# Patient Record
Sex: MALE | Race: WHITE
[De-identification: names, ages, dates, MRNs, and addresses within clinical notes are randomized per-mention and may not be internally consistent; named-entity substitution may affect disease eponyms.]

---

## 2023-01-03 PROBLEM — Z00.00 ENCOUNTER FOR PREVENTIVE HEALTH EXAMINATION: Status: ACTIVE | Noted: 2023-01-03

## 2023-02-07 ENCOUNTER — APPOINTMENT (OUTPATIENT)
Dept: UROLOGY | Facility: CLINIC | Age: 28
End: 2023-02-07
Payer: MEDICAID

## 2023-02-07 VITALS — SYSTOLIC BLOOD PRESSURE: 160 MMHG | HEART RATE: 114 BPM | DIASTOLIC BLOOD PRESSURE: 90 MMHG | TEMPERATURE: 98.7 F

## 2023-02-07 DIAGNOSIS — Q53.10 UNSPECIFIED UNDESCENDED TESTICLE, UNILATERAL: ICD-10-CM

## 2023-02-07 PROCEDURE — 99204 OFFICE O/P NEW MOD 45 MIN: CPT | Mod: 25

## 2023-02-07 PROCEDURE — 93975 VASCULAR STUDY: CPT

## 2023-02-07 NOTE — HISTORY OF PRESENT ILLNESS
[FreeTextEntry1] : 27M unmarried\par has intraabdominal testes\par never attempted to bring down\par here to discuss\par

## 2023-02-07 NOTE — ASSESSMENT
[FreeTextEntry1] : right cryptorchid testis\par moderate left varicocele\par role of cryptorchidism in infertility was reviewed\par given healthy robust left sided testicle it is unclear whether the undescended testis will effect his future fertility potential\par however - his functionally solitary testis has a relatively moderate size varicocele which may affect sperm production\par pt is unable to do semen analysis at this time\par will send TT FSH LH E2\par reviewed options for cryptorchid testis including orchidopexy, annual sonography to r/o malignancy and orchietctomy\par pt will discuss with his Rabbi and call with decision